# Patient Record
Sex: MALE | Race: ASIAN | NOT HISPANIC OR LATINO | Employment: FULL TIME | URBAN - METROPOLITAN AREA
[De-identification: names, ages, dates, MRNs, and addresses within clinical notes are randomized per-mention and may not be internally consistent; named-entity substitution may affect disease eponyms.]

---

## 2023-08-26 ENCOUNTER — APPOINTMENT (OUTPATIENT)
Dept: URGENT CARE | Facility: PHYSICIAN GROUP | Age: 28
End: 2023-08-26
Payer: COMMERCIAL

## 2023-08-26 ENCOUNTER — OCCUPATIONAL MEDICINE (OUTPATIENT)
Dept: URGENT CARE | Facility: PHYSICIAN GROUP | Age: 28
End: 2023-08-26
Payer: COMMERCIAL

## 2023-08-26 VITALS
HEART RATE: 97 BPM | TEMPERATURE: 98.5 F | HEIGHT: 68 IN | OXYGEN SATURATION: 98 % | BODY MASS INDEX: 28.04 KG/M2 | RESPIRATION RATE: 16 BRPM | WEIGHT: 185 LBS | DIASTOLIC BLOOD PRESSURE: 82 MMHG | SYSTOLIC BLOOD PRESSURE: 126 MMHG

## 2023-08-26 DIAGNOSIS — S29.012A STRAIN OF THORACIC BACK REGION: ICD-10-CM

## 2023-08-26 PROCEDURE — 3074F SYST BP LT 130 MM HG: CPT | Performed by: PHYSICIAN ASSISTANT

## 2023-08-26 PROCEDURE — 3079F DIAST BP 80-89 MM HG: CPT | Performed by: PHYSICIAN ASSISTANT

## 2023-08-26 PROCEDURE — 99203 OFFICE O/P NEW LOW 30 MIN: CPT | Performed by: PHYSICIAN ASSISTANT

## 2023-08-26 NOTE — LETTER
[facility] [address] [address]  Phone:  [phone] - Fax:  [phone]   [facility] Progress Report and Disability Certification  Date of Service: 8/26/2023   No Show:  No  Date / Time of Next Visit: 9/1/2023   Claim Information   Patient Name: Uche Gunn  Claim Number:     Employer: [employer]  Date of Injury: 8/25/2023     Insurer / TPA: [employer]  ID / SSN:     Occupation:   Diagnosis: The encounter diagnosis was Strain of thoracic back region.    Medical Information   Related to Industrial Injury? Yes    Subjective Complaints:  DOI: 8/25/23  Initial Visit   ALEX: Patient states he was lifting a heavy cover with a coworker when he suddenly felt a sharp sudden pain to his left-sided mid back.  He continued to work for several hours until he started lifting again and felt the pain again.  He reported to his supervisor.  He states he only feels the pain with certain positions and lifting.  No radiation pain to his legs, numbness, tingling, weakness, loss of bowel or bladder control.  No second job.  Denies a prior back injury.   Objective Findings: Constitutional: Well-appearing in no acute distress  Musculoskeletal: Back: Full range of flexion, extension, rotation, lateralization. Mild reproduction of pain with left lateralization.   Negative TTP  Negative midline tenderness, bony tenderness, crepitus, deformities, or step-offs.  Neurological: Strength 5/5 bilateral lower extremities.  Normal gait.           Pre-Existing Condition(s):     Assessment:   Initial Visit    Status: Additional Care Required  Permanent Disability:No    Plan:      Diagnostics:      Comments:  Plan:   - Treatment of initial rest with no heavy lifting, stooping, or strenuous activity. Massage, ice and/or heat which ever feels better, and Ibuprofen per manufacture's instructions. Encouraged walking, stretching, and range of motion  exercises as tolerated. Avoid sitting or laying down for long periods of time except for at night during sleep.   - work restrictions per D39   - return on 09/01/23     Disability Information   Status: Released to Restricted Duty    From:  8/26/2023  Through: 9/1/2023 Restrictions are: Temporary   Physical Restrictions   Sitting:    Standing:    Stooping:    Bending:      Squatting:    Walking:    Climbing:    Pushing:      Pulling:    Other:    Reaching Above Shoulder (L):   Reaching Above Shoulder (R):       Reaching Below Shoulder (L):    Reaching Below Shoulder (R):      Not to exceed Weight Limits   Carrying(hrs):   Weight Limit(lb):   Lifting(hrs):   Weight  Limit(lb):     Comments: No lifting, pulling, pushing, pulling more than 10 lbs. No repetitive bending     Repetitive Actions   Hands: i.e. Fine Manipulations from Grasping:     Feet: i.e. Operating Foot Controls:     Driving / Operate Machinery:     Health Care Provider’s Original or Electronic Signature  David Gilman P.A.-C. Health Care Provider’s Original or Electronic Signature    Anuel Costa DO MPH     Clinic Name / Location: [address] [address] Clinic Phone Number: Dept: [phone]   Appointment Time: 3:30 Pm Visit Start Time: 3:58 PM   Check-In Time:  3:55 Pm Visit Discharge Time:  4:34 PM   Original-Treating Physician or Chiropractor    Page 2-Insurer/TPA    Page 3-Employer    Page 4-Employee

## 2023-08-26 NOTE — PROGRESS NOTES
"Subjective:     Uche Gunn is a 27 y.o. male who presents for Back Pain (Low back px ,lifting product . X 1 day )      DOI: 8/25/23  Initial Visit   ALEX: Patient states he was lifting a heavy cover with a coworker when he suddenly felt a sharp sudden pain to his left-sided mid back.  He continued to work for several hours until he started lifting again and felt the pain again.  He reported to his supervisor.  He states he only feels the pain with certain positions and lifting.  No radiation pain to his legs, numbness, tingling, weakness, loss of bowel or bladder control.  No second job.  Denies a prior back injury.    PMH:   No pertinent past medical history to this problem  MEDS:  Medications were reviewed in EMR  ALLERGIES:  Allergies were reviewed in EMR  SOCHX:  Works as a    FH:   No pertinent family history to this problem       Objective:     /82   Pulse 97   Temp 36.9 °C (98.5 °F) (Temporal)   Resp 16   Ht 1.727 m (5' 8\")   Wt 83.9 kg (185 lb)   SpO2 98%   BMI 28.13 kg/m²     Constitutional: Well-appearing in no acute distress  Musculoskeletal: Back: Full range of flexion, extension, rotation, lateralization. Mild reproduction of pain with left lateralization.   Negative TTP  Negative midline tenderness, bony tenderness, crepitus, deformities, or step-offs.  Neurological: Strength 5/5 bilateral lower extremities.  Normal gait.            Assessment/Plan:       1. Strain of thoracic back region    Released to Restricted Duty FROM 8/26/2023 TO 9/1/2023  No lifting, pulling, pushing, pulling more than 10 lbs. No repetitive bending   Plan:   - Treatment of initial rest with no heavy lifting, stooping, or strenuous activity. Massage, ice and/or heat which ever feels better, and Ibuprofen per manufacture's instructions. Encouraged walking, stretching, and range of motion exercises as tolerated. Avoid sitting or laying down for long periods of time except for at night " during sleep.   - work restrictions per D39   - return on 09/01/23     Differential diagnosis, natural history, supportive care, and indications for immediate follow-up discussed.

## 2023-08-26 NOTE — LETTER
"EMPLOYEE’S CLAIM FOR COMPENSATION/ REPORT OF INITIAL TREATMENT  FORM C-4  PLEASE TYPE OR PRINT    EMPLOYEE’S CLAIM - PROVIDE ALL INFORMATION REQUESTED   First Name  Uche Benoit Last Name  Luis A Birthdate                    1995                Sex  male Claim Number (Insurer’s Use Only)   Home Address  [address] Age  27 y.o. Height  1.727 m (5' 8\") Weight  83.9 kg (185 lb) [facility]     [facility] [place] [facility] [place]  [address] Telephone  [phone] (home)    Mailing Address  [address] [facility]  [address] Primary Language Spoken  English    INSURER   THIRD-PARTY     [employer]   Employee's Occupation (Job Title) When Injury or Occupational Disease Occurred      Employer's Name/Company Name  [employer]  Telephone  [phone]    Office Mail Address (Number and Street)  [address]        Date of Injury  8/25/2023               Hours Injury  8:00 PM Date Employer Notified  8/25/2023 Last Day of Work after Injury or Occupational Disease  8/26/2023 Supervisor to Whom Injury     Reported  Tyrell Ramirez & Varun Ascencio   Address or Location of Accident (if applicable)  Work [1]   What were you doing at the time of accident? (if applicable)  lifting service crate cover    How did this injury or occupational disease occur? (Be specific and answer in detail. Use additional sheet if necessary)  my back hurt while lifting service crate cover   If you believe that you have an occupational disease, when did you first have knowledge of the disability and its relationship to your employment?  NA Witnesses to the Accident (if applicable)  Juan Hendrix and Tyrell Ramirez      Nature of Injury or Occupational Disease  Workers' Compensation  Part(s) of Body Injured or Affected  Lower Back Area (Lumbar Area & Lumbo-Sacral), Upper Back Area (Thoracic Area), N/A    I CERTIFY " THAT THE ABOVE IS TRUE AND CORRECT TO T HE BEST OF MY KNOWLEDGE AND THAT I HAVE PROVIDED THIS INFORMATION IN ORDER TO OBTAIN THE BENEFITS OF [place], OR [place]).  I HEREBY AUTHORIZE ANY PHYSICIAN, CHIROPRACTOR, SURGEON, PRACTITIONER OR ANY OTHER PERSON, ANY HOSPITAL, INCLUDING [facility] OR [facility], ANY  MEDICAL SERVICE ORGANIZATION, ANY INSURANCE COMPANY, OR OTHER INSTITUTION OR ORGANIZATION TO RELEASE TO EACH OTHER, ANY MEDICAL OR OTHER INFORMATION, INCLUDING BENEFITS PAID OR PAYABLE, PERTINENT TO THIS INJURY OR DISEASE, EXCEPT INFORMATION RELATIVE TO DIAGNOSIS, TREATMENT AND/OR COUNSELING FOR AIDS, PSYCHOLOGICAL CONDITIONS, ALCOHOL OR CONTROLLED SUBSTANCES, FOR WHICH I MUST GIVE SPECIFIC AUTHORIZATION.  A PHOTOSTAT OF THIS AUTHORIZATION SHALL BE VALID AS THE ORIGINAL.       Date   Place Employee’s Original or  *Electronic Signature   THIS REPORT MUST BE COMPLETED AND MAILED WITHIN 3 WORKING DAYS OF TREATMENT   Place  [facility]  Name of Facility  [facility]   Date  8/26/2023 Diagnosis and Description of Injury or Occupational Disease  (S29.012A) Strain of thoracic back region Is there evidence that the injured employee was under the influence of alcohol and/or another controlled substance at the time of accident?  ? No ? Yes (if yes, please explain)   Hour  3:58 PM   The encounter diagnosis was Strain of thoracic back region. No   Treatment  - Treatment of initial rest with no heavy lifting, stooping, or strenuous activity. Massage, ice and/or heat which ever feels better, and Ibuprofen per manufacture's instructions. Encouraged walking, stretching, and range of motion exercises as tolerated. Avoid sitting or laying down for long periods of time except for at night during sleep.   - work restrictions per D39   - return on 09/01/23   Have you advised the patient to remain off  work five days or     more?    X-Ray Findings      ? Yes Indicate dates:   From   To      From information given by the employee, together with medical evidence, can        you directly connect this injury or occupational disease as job incurred?  Yes ? No If no, is the injured employee capable of:  ? full duty  No ? modified duty  Ag   Is additional medical care by a physician indicated?  Yes If modified duty, specify any limitations / restrictions  Any duty following work restrictions per D39    Do you know of any previous injury or disease contributing to this condition or occupational disease?  ? Yes ? No (Explain if yes)                          No   Date  8/26/2023 Print Health Care Provider's  Name  Kaushik Gilman P.A.-C. I certify that the employer’s copy of  this form was delivered to the employer on:   Address  [address]  [facility] Insurer’s Use Only     [facility]  [address]    Provider’s Tax ID Number  [id number] Telephone  Dept: [phone]             Health Care Provider’s Original or Electronic Signature  e-KAUSHIK Taylor P.A.-C. Degree (MD,DO, DC,PAEBER,APRN)  PAAishaC      * Complete and attach Release of Information (Form C-4A) when injured employee signs C-4 Form electronically  ORIGINAL - TREATING HEALTHCARE PROVIDER PAGE 2 - INSURER/TPA PAGE 3 - EMPLOYER PAGE 4 - EMPLOYEE             Form C-4 (rev.08/21)           BRIEF DESCRIPTION OF RIGHTS AND BENEFITS  (Pursuant to [place] 616C.050)    Notice of Injury or Occupational Disease (Incident Report Form C-1): If an injury or occupational disease (OD) arises out of and in the course of employment, you must provide written notice to your employer as soon as practicable, but no later than 7 days after the accident or OD. Your employer shall maintain a sufficient supply of the required forms.    Claim for Compensation (Form C-4): If medical treatment is sought, the form C-4 is available at the place of initial treatment. A completed  "\"Claim for Compensation\" (Form C-4) must be filed within 90 days after an accident or OD. The treating physician or chiropractor must, within 3 working days after treatment, complete and mail to the employer, the employer's insurer and third-party , the Claim for Compensation.    Medical Treatment: If you require medical treatment for your on-the-job injury or OD, you may be required to select a physician or chiropractor from a list provided by your workers’ compensation insurer, if it has contracted with an Organization for Managed Care (MCO) or Preferred Provider Organization (PPO) or providers of health care. If your employer has not entered into a contract with an MCO or PPO, you may select a physician or chiropractor from the Panel of Physicians and Chiropractors. Any medical costs related to your industrial injury or OD will be paid by your insurer.    Temporary Total Disability (TTD): If your doctor has certified that you are unable to work for a period of at least 5 consecutive days, or 5 cumulative days in a 20-day period, or places restrictions on you that your employer does not accommodate, you may be entitled to TTD compensation.    Temporary Partial Disability (TPD): If the wage you receive upon reemployment is less than the compensation for TTD to which you are entitled, the insurer may be required to pay you TPD compensation to make up the difference. TPD can only be paid for a maximum of 24 months.    Permanent Partial Disability (PPD): When your medical condition is stable and there is an indication of a PPD as a result of your injury or OD, within 30 days, your insurer must arrange for an evaluation by a rating physician or chiropractor to determine the degree of your PPD. The amount of your PPD award depends on the date of injury, the results of the PPD evaluation, your age and wage.    Permanent Total Disability (PTD): If you are medically certified by a treating physician or " chiropractor as permanently and totally disabled and have been granted a PTD status by your insurer, you are entitled to receive monthly benefits not to exceed 66 2/3% of your average monthly wage. The amount of your PTD payments is subject to reduction if you previously received a lump-sum PPD award.    Vocational Rehabilitation Services: You may be eligible for vocational rehabilitation services if you are unable to return to the job due to a permanent physical impairment or permanent restrictions as a result of your injury or occupational disease.    Transportation and Per Alva Reimbursement: You may be eligible for travel expenses and per alva associated with medical treatment.    Reopening: You may be able to reopen your claim if your condition worsens after claim closure.     Appeal Process: If you disagree with a written determination issued by the insurer or the insurer does not respond to your request, you may appeal to the Department of Administration, , by following the instructions contained in your determination letter. You must appeal the determination within 70 days from the date of the determination letter at [address], [address], [place] [address], or [address] [facility], [address], [place] [address]. If you disagree with the  decision, you may appeal to the Department of Administration, . You must file your appeal within 30 days from the date of the  decision letter at [address], [address], [place], [place] [address], or [address], [place] [address]. If you disagree with a decision of an , you may file a petition for judicial review with the District Court. You must do so within 30 days of the Appeal Officer’s decision. You may be represented by an  at your own expense or you may contact the [facility] for possible representation.    [place]  for  Injured Workers (NAIW): If you disagree with a  decision, you may request that NAIW represent you without charge at an  Hearing. For information regarding denial of benefits, you may contact the NA at: [address], [address], [place] [address], [phone], or [address] [facility], [address], [place] [address], [phone]    To File a Complaint with the Division: If you wish to file a complaint with the  of the Division of Industrial Relations (DIR),  please contact the Workers’ Compensation Section, [address], [address], [place], [place] [address], telephone [phone]) [phone], or [address], [address], [place], [place] [address], telephone [phone].    For assistance with Workers’ Compensation Issues: You may contact the [facility] Office for Consumer Health Assistance, [address], [address], [place], [place] [address], Toll Free 1-575.364.6621, Web site: http://[facility].[place].gov/Programs/KELLY E-mail: [email or web address]              __________________________________________________________________                                    _________________            Employee Name / Signature                                                                                                                            Date                                                                                                                                                                                                                              D-2 (rev. 10/20)

## 2023-09-01 ENCOUNTER — OCCUPATIONAL MEDICINE (OUTPATIENT)
Dept: URGENT CARE | Facility: PHYSICIAN GROUP | Age: 28
End: 2023-09-01
Payer: COMMERCIAL

## 2023-09-01 VITALS
DIASTOLIC BLOOD PRESSURE: 84 MMHG | SYSTOLIC BLOOD PRESSURE: 118 MMHG | OXYGEN SATURATION: 97 % | TEMPERATURE: 98 F | BODY MASS INDEX: 28.04 KG/M2 | WEIGHT: 185 LBS | HEART RATE: 75 BPM | HEIGHT: 68 IN | RESPIRATION RATE: 16 BRPM

## 2023-09-01 DIAGNOSIS — S39.012D STRAIN OF LUMBAR REGION, SUBSEQUENT ENCOUNTER: ICD-10-CM

## 2023-09-01 PROCEDURE — 3074F SYST BP LT 130 MM HG: CPT | Performed by: FAMILY MEDICINE

## 2023-09-01 PROCEDURE — 99213 OFFICE O/P EST LOW 20 MIN: CPT | Performed by: FAMILY MEDICINE

## 2023-09-01 PROCEDURE — 3079F DIAST BP 80-89 MM HG: CPT | Performed by: FAMILY MEDICINE

## 2023-09-01 NOTE — LETTER
[facility] [address] [address]  Phone:  [phone] - Fax:  [phone]   [facility] Progress Report and Disability Certification  Date of Service: 9/1/2023   No Show:  No  Date / Time of Next Visit:     Claim Information   Patient Name: Uche Gunn  Claim Number:     Employer: [employer]  Date of Injury: 8/25/2023     Insurer / TPA: [employer]  ID / SSN:     Occupation:   Diagnosis: The encounter diagnosis was Strain of lumbar region, subsequent encounter.    Medical Information   Related to Industrial Injury? Yes    Subjective Complaints:  Reports that the back pain is completely gone   Objective Findings: Full rom without pain, good strength, no sensory deficits.   Pre-Existing Condition(s):     Assessment:   Condition Improved    Status: Discharged /  MMI  Permanent Disability:No    Plan:      Diagnostics:      Comments:       Disability Information   Status: Released to Full Duty    From:     Through:   Restrictions are:     Physical Restrictions   Sitting:    Standing:    Stooping:    Bending:      Squatting:    Walking:    Climbing:    Pushing:      Pulling:    Other:    Reaching Above Shoulder (L):   Reaching Above Shoulder (R):       Reaching Below Shoulder (L):    Reaching Below Shoulder (R):      Not to exceed Weight Limits   Carrying(hrs):   Weight Limit(lb):   Lifting(hrs):   Weight  Limit(lb):     Comments:      Repetitive Actions   Hands: i.e. Fine Manipulations from Grasping:     Feet: i.e. Operating Foot Controls:     Driving / Operate Machinery:     Health Care Provider’s Original or Electronic Signature  Frank Muñoz M.D. Health Care Provider’s Original or Electronic Signature    Anuel Costa DO MPH     Clinic Name / Location: [facility] [address] [address] Clinic Phone Number: Dept: [phone]   Appointment Time: 3:30 Pm Visit Start  Time: 3:58 PM   Check-In Time:  3:29 Pm Visit Discharge Time:  4:36 PM   Original-Treating Physician or Chiropractor    Page 2-Insurer/TPA    Page 3-Employer    Page 4-Employee

## 2023-09-01 NOTE — LETTER
[facility] [address] [address]  Phone:  [phone] - Fax:  [phone]   Occ[facility] Progress Report and Disability Certification  Date of Service: 9/1/2023   No Show:  No  Date / Time of Next Visit:     Claim Information   Patient Name: Uche Gunn  Claim Number:     Employer: [employer]  Date of Injury: 8/25/2023     Insurer / TPA: [employer]  ID / SSN:     Occupation:   Diagnosis: The encounter diagnosis was Strain of lumbar region, subsequent encounter.    Medical Information   Related to Industrial Injury? Yes    Subjective Complaints:  Reports that the back pain is completely gone   Objective Findings: Full rom without pain, good strength, no sensory deficits.   Pre-Existing Condition(s):     Assessment:   Condition Improved    Status: Discharged /  MMI  Permanent Disability:No    Plan:      Diagnostics:      Comments:       Disability Information   Status: Released to Full Duty    From:     Through:   Restrictions are:     Physical Restrictions   Sitting:    Standing:    Stooping:    Bending:      Squatting:    Walking:    Climbing:    Pushing:      Pulling:    Other:    Reaching Above Shoulder (L):   Reaching Above Shoulder (R):       Reaching Below Shoulder (L):    Reaching Below Shoulder (R):      Not to exceed Weight Limits   Carrying(hrs):   Weight Limit(lb):   Lifting(hrs):   Weight  Limit(lb):     Comments:      Repetitive Actions   Hands: i.e. Fine Manipulations from Grasping:     Feet: i.e. Operating Foot Controls:     Driving / Operate Machinery:     Health Care Provider’s Original or Electronic Signature  Frank Muñoz M.D. Health Care Provider’s Original or Electronic Signature    Anuel Costa DO MPH     Clinic Name / Location: [facility] [address] [address] Clinic Phone Number: Dept: [phone]   Appointment Time: 3:30 Pm Visit Start  Time: 3:58 PM   Check-In Time:  3:29 Pm Visit Discharge Time:  4:35 PM   Original-Treating Physician or Chiropractor    Page 2-Insurer/TPA    Page 3-Employer    Page 4-Employee

## 2023-09-01 NOTE — PROGRESS NOTES
"Subjective:   Uche Gunn is a 27 y.o. male who presents for Follow-Up (WC Follow up , back injury. Almost better .)      HPI    ROS    Medications, Allergies, and current problem list reviewed today in Epic.     Objective:     /84   Pulse 75   Temp 36.7 °C (98 °F) (Temporal)   Resp 16   Ht 1.727 m (5' 8\")   Wt 83.9 kg (185 lb)   SpO2 97%     Physical Exam  Musculoskeletal:      Comments: No pain on palpation, no pain with rom, able to stoop, and bend without problems.         Assessment/Plan:     Diagnosis and associated orders:     1. Strain of lumbar region, subsequent encounter           Comments/MDM:     Return to work no restrictions         Differential diagnosis, natural history, supportive care, and indications for immediate follow-up discussed.    Advised the patient to follow-up with the primary care physician for recheck, reevaluation, and consideration of further management.    Please note that this dictation was created using voice recognition software. I have made a reasonable attempt to correct obvious errors, but I expect that there are errors of grammar and possibly content that I did not discover before finalizing the note.    This note was electronically signed by Frank Muñoz M.D.  "

## 2025-04-09 ENCOUNTER — OCCUPATIONAL MEDICINE (OUTPATIENT)
Dept: URGENT CARE | Facility: PHYSICIAN GROUP | Age: 30
End: 2025-04-09
Payer: COMMERCIAL

## 2025-04-09 VITALS
RESPIRATION RATE: 18 BRPM | WEIGHT: 202.4 LBS | DIASTOLIC BLOOD PRESSURE: 76 MMHG | SYSTOLIC BLOOD PRESSURE: 122 MMHG | BODY MASS INDEX: 30.68 KG/M2 | HEART RATE: 82 BPM | HEIGHT: 68 IN | TEMPERATURE: 97.9 F | OXYGEN SATURATION: 98 %

## 2025-04-09 DIAGNOSIS — M76.61 ACHILLES TENDINITIS OF RIGHT LOWER EXTREMITY: ICD-10-CM
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
## 2025-04-09 PROCEDURE — 99213 OFFICE O/P EST LOW 20 MIN: CPT | Performed by: PHYSICIAN ASSISTANT

## 2025-04-09 PROCEDURE — 3078F DIAST BP <80 MM HG: CPT | Performed by: PHYSICIAN ASSISTANT

## 2025-04-09 PROCEDURE — 3074F SYST BP LT 130 MM HG: CPT | Performed by: PHYSICIAN ASSISTANT

## 2025-04-09 RX ORDER — METHYLPREDNISOLONE 4 MG/1
4 TABLET ORAL DAILY
Qty: 21 TABLET | Refills: 0 | Status: SHIPPED | OUTPATIENT
Start: 2025-04-09 | End: 2025-04-21

## 2025-04-09 NOTE — PROGRESS NOTES
"Subjective:     Uche Gunn is a 29 y.o. 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  Describes the pain as being on the heel where the Achilles tendon inserts.  Symptoms are worsened with climbing up and down stairs frequently and with stretching of the Achilles tendon.  No numbness/tingling or weakness.  Has not used medications for symptom support.  No secondary occupation, no predisposing injury.    PMH:   No pertinent past medical history to this problem  MEDS:  Medications were reviewed in EMR  ALLERGIES:  Allergies were reviewed in EMR  SOCHX:  Works as a   FH:   No pertinent family history to this problem       Objective:     /76   Pulse 82   Temp 36.6 °C (97.9 °F) (Temporal)   Resp 18   Ht 1.727 m (5' 8\")   Wt 91.8 kg (202 lb 6.4 oz)   SpO2 98%   BMI 30.77 kg/m²     Examination of the right foot is negative for tenderness on the plantar fascial structures.  Tenderness directly over the Achilles insertion point on the calcaneus.  No tenderness along the Achilles tendon.      Diagnostic:  None    Assessment/Plan:     Encounter Diagnoses   Name Primary?    Achilles tendinitis of right lower extremity     Work related injury          Plan:  Trial Medrol Dosepak.  Work restrictions provided.  Return in 5 days for reevaluation.    Differential diagnosis, natural history, supportive care, and indications for immediate follow-up discussed.    Seven Stallings PA-C    "

## 2025-04-09 NOTE — LETTER
PHYSICIAN’S AND CHIROPRACTIC PHYSICIAN'S   PROGRESS REPORT   CERTIFICATION OF DISABILITY Claim Number:     Social Security Number:    Patient’s Name: Uche Gunn Date of Injury: 4/1/2025   Employer: [employer] Name of MCO (if applicable):      Patient’s Job Description/Occupation:        Previous Injuries/Diseases/Surgeries Contributing to the Condition:         Diagnosis: (M76.61) Achilles tendinitis of right lower extremity  (Y99.0) Work related injury      Related to the Industrial Injury? 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  Describes the pain as being on the heel where the Achilles tendon inserts.  Symptoms are worsened with climbing up and down stairs frequently and with stretching of the Achilles tendon.  No numbness/tingling or weakness.  Has not used medications for symptom support.  No secondary occupation, no predisposing injury.      Objective Medical Findings: Examination of the right foot is negative for tenderness on the plantar fascial structures.  Tenderness directly over the Achilles insertion point on the calcaneus.  No tenderness along the Achilles tendon.         None - Discharged                         Stable  No                 Ratable  No        Generally Improved                         Condition Worsened                  Condition Same  May Have Suffered a Permanent Disability No     Treatment Plan:    Trial Medrol Dosepak.  Work restrictions provided.  Return in 5 days for reevaluation.         No Change in Therapy                  PT/OT Prescribed                      Medication May be Used While Working        Case Management                          PT/OT Discontinued    Consultation    Further Diagnostic Studies:    Prescription(s)           Medrol  Dosepak     Released to FULL DUTY /No Restrictions on (Date):       Certified TOTALLY TEMPORARILY DISABLED (Indicate Dates) From:   To:    X  Released to RESTRICTED/Modified Duty on (Date): From: 4/9/2025 To: 4/14/2025  Restrictions Are:  Temporary      No Sitting    No Standing    No Pulling Other: No climbing stairs       No Bending at Waist     No Stooping     No Lifting        No Carrying     No Walking Lifting Restricted to (lbs.):          No Pushing     X   No Climbing     No Reaching Above Shoulders       Date of Next Visit:  4/14/2025 Date of this Exam: 4/9/2025 Physician/Chiropractic Physician Name: Seven Stallings P.A.-C. Physician/Chiropractic Physician Signature:  Anuel Costa DO MPH     [place]:  [address] [place] [address], [address] [place], [place] [address] - Telephone [phone] [place]:  [address] [place] [address], [address] [place], [place] [address] - Telephone [phone]    [email or web address]  D-39 (Rev. 10/24)

## 2025-04-09 NOTE — LETTER
"  EMPLOYEE’S CLAIM FOR COMPENSATION/ REPORT OF INITIAL TREATMENT  FORM C-4  PLEASE TYPE OR PRINT    EMPLOYEE’S CLAIM - PROVIDE ALL INFORMATION REQUESTED   First Name                    KATI Benoit                  Last Name  Luis A Birthdate                    1995                Sex  [x]Male Claim Number (Insurer’s Use Only)     Mailing Address  [address] Age  29 y.o. Height  1.727 m (5' 8\") Weight  91.8 kg (202 lb 6.4 oz) Social Security Number     [facility]  [address] Telephone  [phone] (home)    Email  [email or web address]    Primary Language Spoken  English    INSURER   THIRD-PARTY   [employer]   Employee's Occupation (Job Title) When Injury or Occupational Disease Occurred      Employer's Name/Company Name  [employer]  Telephone  [phone]    Office Mail Address (Number and Street)  [address]     Date of Injury (if applicable) 4/1/2025               Hours Injury (if applicable)  5:00 AM am    pm Date Employer Notified  4/2/2025 Last Day of Work after Injury or Occupational Disease  4/6/2025 Supervisor to Whom Injury Reported  [employer]   Address or Location of Accident (if applicable)  Work [1]   What were you doing at the time of accident? 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 (Be specific and answer in detail. Use additional sheet if necessary)  my right foot heel hurt and start to progress   If you believe that you have an occupational disease, when did you first have knowledge of the disability and its relationship to your employment?  n/a Witnesses to the Accident (if applicable)  n/a      Nature of Injury or Occupational Disease  Strain  Part(s) of Body Injured or Affected  Foot (R) N/A N/A    I CERTIFY THAT THE " ABOVE IS TRUE AND CORRECT TO T HE BEST OF MY KNOWLEDGE AND THAT I HAVE PROVIDED THIS INFORMATION IN ORDER TO OBTAIN THE BENEFITS OF [place], OR CHAPTER 617 OF [place]RS).  I HEREBY AUTHORIZE ANY PHYSICIAN, CHIROPRACTOR, SURGEON, PRACTITIONER OR ANY OTHER PERSON, ANY HOSPITAL, INCLUDING [facility] OR [facility], ANY  MEDICAL SERVICE ORGANIZATION, ANY INSURANCE COMPANY, OR OTHER INSTITUTION OR ORGANIZATION TO RELEASE TO EACH OTHER, ANY MEDICAL OR OTHER INFORMATION, INCLUDING BENEFITS PAID OR PAYABLE, PERTINENT TO THIS INJURY OR DISEASE, EXCEPT INFORMATION RELATIVE TO DIAGNOSIS, TREATMENT AND/OR COUNSELING FOR AIDS, PSYCHOLOGICAL CONDITIONS, ALCOHOL OR CONTROLLED SUBSTANCES, FOR WHICH I MUST GIVE SPECIFIC AUTHORIZATION.  A PHOTOSTAT OF THIS AUTHORIZATION SHALL BE VALID AS THE ORIGINAL.     Date   Place Employee’s Original or  *Electronic Signature   THIS REPORT MUST BE COMPLETED AND MAILED WITHIN 3 WORKING DAYS OF TREATMENT   Place  [facility]    Name of Facility  [facility]   Date 4/9/2025 Diagnosis and Description of Injury or Occupational Disease  (M76.61) Achilles tendinitis of right lower extremity  (Y99.0) Work related injury  Diagnoses of Achilles tendinitis of right lower extremity and Work related injury were pertinent to this visit. Is there evidence that the injured employee was under the influence of alcohol and/or another controlled substance at the time of accident?  []No  [] Yes (if yes, please explain)   Hour 1:05 PM  No   Treatment: Trial Medrol Dosepak.  Work restrictions provided.  Return in 5 days for reevaluation.      Have you advised the patient to remain off work five days or more?   No  [] Yes  If yes, indicate dates: From_ _                                                      To __ _  [] No   If no, is the injured employee capable of: [] full duty No   [] modified duty Yes    If  modified duty, specify any limitations / restrictions:__________________  ___See R20___________________________     X-Ray Findings:      From information given by the employee, together with medical evidence, can you directly connect this injury or occupational disease as job incurred?  []Yes   [] No Yes    Is additional medical care by a physician indicated? []Yes [] No  Yes    Do you know of any previous injury or disease contributing to this condition or occupational disease? []Yes [] No (Explain if yes)                          No   Date  4/9/2025 Print Health Care Provider’s Name  Jessica Stallings P.A.-C. I certify that the employer’s copy of  this form was delivered to the employer on:   Address  [address]  [facility] INSURER'S USE ONLY                       [facility]  [address] Provider’s Tax ID Number  [id number]   Telephone  Dept: [phone]    Health Care Provider’s Original or Electronic Signature      e-JESSICA King P.A.-C.    Degree (MD,DO, DC,PAAishaC,APRN)  PAEBER  Choose (if applicable)      ORIGINAL - TREATING HEALTHCARE PROVIDER PAGE 2 - INSURER/TPA PAGE 3 - EMPLOYER PAGE 4 - EMPLOYEE             Form C-4 (rev.02/25)

## 2025-04-09 NOTE — LETTER
PHYSICIAN’S AND CHIROPRACTIC PHYSICIAN'S   PROGRESS REPORT   CERTIFICATION OF DISABILITY Claim Number:     Social Security Number:    Patient’s Name: Uche Gunn Date of Injury: 4/1/2025   Employer: [employer] Name of MCO (if applicable):      Patient’s Job Description/Occupation:        Previous Injuries/Diseases/Surgeries Contributing to the Condition:         Diagnosis: (M76.61) Achilles tendinitis of right lower extremity  (Y99.0) Work related injury      Related to the Industrial Injury? 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  Describes the pain as being on the heel where the Achilles tendon inserts.  Symptoms are worsened with climbing up and down stairs frequently and with stretching of the Achilles tendon.  No numbness/tingling or weakness.  Has not used medications for symptom support.  No secondary occupation, no predisposing injury.      Objective Medical Findings: Examination of the right foot is negative for tenderness on the plantar fascial structures.  Tenderness directly over the Achilles insertion point on the calcaneus.  No tenderness along the Achilles tendon.         None - Discharged                         Stable  No                 Ratable  No        Generally Improved                         Condition Worsened                  Condition Same  May Have Suffered a Permanent Disability No     Treatment Plan:    Trial Medrol Dosepak.  Work restrictions provided.  Return in 5 days for reevaluation.         No Change in Therapy                  PT/OT Prescribed                      Medication May be Used While Working        Case Management                          PT/OT Discontinued    Consultation    Further Diagnostic Studies:    Prescription(s)           Medrol  Dosepak     Released to FULL DUTY /No Restrictions on (Date):       Certified TOTALLY TEMPORARILY DISABLED (Indicate Dates) From:   To:    X  Released to RESTRICTED/Modified Duty on (Date): From: 4/9/2025 To: 4/14/2025  Restrictions Are:  Temporary      No Sitting    No Standing X   No Pulling Other: No climbing stairs       No Bending at Waist     No Stooping     No Lifting        No Carrying     No Walking Lifting Restricted to (lbs.):       X   No Pushing     X   No Climbing     No Reaching Above Shoulders       Date of Next Visit:  4/14/2025 Date of this Exam: 4/9/2025 Physician/Chiropractic Physician Name: Seven Stallings P.A.-C. Physician/Chiropractic Physician Signature:  Anuel Costa DO MPH     [place]:  [address] [place] [address], [address] [place], [place] [address] - Telephone [phone] [place]:  [address] [place] [address], [address] [place], [place] [address] - Telephone [phone]    [email or web address]  D-39 (Rev. 10/24)

## 2025-04-09 NOTE — LETTER
"  EMPLOYEE’S CLAIM FOR COMPENSATION/ REPORT OF INITIAL TREATMENT  FORM C-4  PLEASE TYPE OR PRINT    EMPLOYEE’S CLAIM - PROVIDE ALL INFORMATION REQUESTED   First Name                    KATI Benoit                  Last Name  Luis A Birthdate                    1995                Sex  [x]Male Claim Number (Insurer’s Use Only)     Mailing Address  [address] Age  29 y.o. Height  1.727 m (5' 8\") Weight  91.8 kg (202 lb 6.4 oz) Social Security Number     [facility]  [address] Telephone  [phone] (home)    Email  [email or web address]    Primary Language Spoken  English    INSURER   THIRD-PARTY   [employer]   Employee's Occupation (Job Title) When Injury or Occupational Disease Occurred      Employer's Name/Company Name  [employer]  Telephone  [phone]    Office Mail Address (Number and Street)  [address]     Date of Injury (if applicable) 4/1/2025               Hours Injury (if applicable)  5:00 AM am    pm Date Employer Notified  4/2/2025 Last Day of Work after Injury or Occupational Disease  4/6/2025 Supervisor to Whom Injury Reported  SONIA FORREST   Address or Location of Accident (if applicable)  Work [1]   What were you doing at the time of accident? 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 (Be specific and answer in detail. Use additional sheet if necessary)  my right foot heel hurt and start to progress   If you believe that you have an occupational disease, when did you first have knowledge of the disability and its relationship to your employment?  n/a Witnesses to the Accident (if applicable)  n/a      Nature of Injury or Occupational Disease  Strain  Part(s) of Body Injured or Affected  Foot (R) N/A N/A    I CERTIFY THAT THE " ABOVE IS TRUE AND CORRECT TO T HE BEST OF MY KNOWLEDGE AND THAT I HAVE PROVIDED THIS INFORMATION IN ORDER TO OBTAIN THE BENEFITS OF [place], [place], OR CHAPTER 617 OF [place]).  I HEREBY AUTHORIZE ANY PHYSICIAN, CHIROPRACTOR, SURGEON, PRACTITIONER OR ANY OTHER PERSON, ANY HOSPITAL, INCLUDING [facility], ANY  MEDICAL SERVICE ORGANIZATION, ANY INSURANCE COMPANY, OR OTHER INSTITUTION OR ORGANIZATION TO RELEASE TO EACH OTHER, ANY MEDICAL OR OTHER INFORMATION, INCLUDING BENEFITS PAID OR PAYABLE, PERTINENT TO THIS INJURY OR DISEASE, EXCEPT INFORMATION RELATIVE TO DIAGNOSIS, TREATMENT AND/OR COUNSELING FOR AIDS, PSYCHOLOGICAL CONDITIONS, ALCOHOL OR CONTROLLED SUBSTANCES, FOR WHICH I MUST GIVE SPECIFIC AUTHORIZATION.  A PHOTOSTAT OF THIS AUTHORIZATION SHALL BE VALID AS THE ORIGINAL.     Date   Place Employee’s Original or  *Electronic Signature   THIS REPORT MUST BE COMPLETED AND MAILED WITHIN 3 WORKING DAYS OF TREATMENT   Place  [facility]    Name of Facility  [place]   Date 4/9/2025 Diagnosis and Description of Injury or Occupational Disease  (M76.61) Achilles tendinitis of right lower extremity  (Y99.0) Work related injury  Diagnoses of Achilles tendinitis of right lower extremity and Work related injury were pertinent to this visit. Is there evidence that the injured employee was under the influence of alcohol and/or another controlled substance at the time of accident?  []No  [] Yes (if yes, please explain)   Hour 1:05 PM  No   Treatment: Trial Medrol Dosepak.  Work restrictions provided.  Return in 5 days for reevaluation.      Have you advised the patient to remain off work five days or more?   No  [] Yes  If yes, indicate dates: From_ _                                                      To __ _  [] No   If no, is the injured employee capable of: [] full duty No   [] modified duty Yes    If  modified duty, specify any limitations / restrictions:__________________  ___See Z06___________________________     X-Ray Findings:      From information given by the employee, together with medical evidence, can you directly connect this injury or occupational disease as job incurred?  []Yes   [] No Yes    Is additional medical care by a physician indicated? []Yes [] No  Yes    Do you know of any previous injury or disease contributing to this condition or occupational disease? []Yes [] No (Explain if yes)                          No   Date  4/9/2025 Print Health Care Provider’s Name  Jessica Stallings P.A.-C. I certify that the employer’s copy of  this form was delivered to the employer on:   Address  [address]  [facility] INSURER'S USE ONLY                       [facility]  [address] Provider’s Tax ID Number  [id number]   Telephone  Dept: [phone]    Health Care Provider’s Original or Electronic Signature      e-JESSICA King P.A.-C.    Degree (MD,DO, DC,PAAishaC,APRN)  PAEBER  Choose (if applicable)      ORIGINAL - TREATING HEALTHCARE PROVIDER PAGE 2 - INSURER/TPA PAGE 3 - EMPLOYER PAGE 4 - EMPLOYEE             Form C-4 (rev.02/25)

## 2025-04-14 ENCOUNTER — OCCUPATIONAL MEDICINE (OUTPATIENT)
Dept: URGENT CARE | Facility: PHYSICIAN GROUP | Age: 30
End: 2025-04-14
Payer: COMMERCIAL

## 2025-04-14 VITALS
TEMPERATURE: 97.5 F | DIASTOLIC BLOOD PRESSURE: 88 MMHG | OXYGEN SATURATION: 97 % | RESPIRATION RATE: 12 BRPM | SYSTOLIC BLOOD PRESSURE: 130 MMHG | HEIGHT: 68 IN | WEIGHT: 204 LBS | HEART RATE: 93 BPM | BODY MASS INDEX: 30.92 KG/M2

## 2025-04-14 DIAGNOSIS — M76.61 ACHILLES TENDINITIS OF RIGHT LOWER EXTREMITY: ICD-10-CM

## 2025-04-14 PROCEDURE — 99213 OFFICE O/P EST LOW 20 MIN: CPT | Performed by: FAMILY MEDICINE

## 2025-04-14 PROCEDURE — 3079F DIAST BP 80-89 MM HG: CPT | Performed by: FAMILY MEDICINE

## 2025-04-14 PROCEDURE — 3075F SYST BP GE 130 - 139MM HG: CPT | Performed by: FAMILY MEDICINE

## 2025-04-14 ASSESSMENT — ENCOUNTER SYMPTOMS
SENSORY CHANGE: 0
FOCAL WEAKNESS: 0
BACK PAIN: 0

## 2025-04-14 NOTE — LETTER
PHYSICIAN’S AND CHIROPRACTIC PHYSICIAN'S   PROGRESS REPORT   CERTIFICATION OF DISABILITY Claim Number:     Social Security Number:    Patient’s Name: Uche Gunn Date of Injury: 4/1/2025   Employer: [employer] Name of MCO (if applicable):      Patient’s Job Description/Occupation:        Previous Injuries/Diseases/Surgeries Contributing to the Condition:  NA        Diagnosis: (M76.61) Achilles tendinitis of right lower extremity      Related to the Industrial Injury? 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      Objective Medical Findings: Right foot and ankle: no point tenderness, full range of motion, full strength         None - Discharged                         Stable  No                 Ratable  No     X   Generally Improved                         Condition Worsened                  Condition Same  May Have Suffered a Permanent Disability No     Treatment Plan:    Advance work restrictions           No Change in Therapy                  PT/OT Prescribed                      Medication May be Used While Working        Case Management                          PT/OT Discontinued    Consultation    Further Diagnostic Studies:    Prescription(s)                 Released to FULL DUTY /No Restrictions on (Date):       Certified TOTALLY TEMPORARILY DISABLED (Indicate Dates) From:   To:    X  Released to RESTRICTED/Modified Duty on (Date): From: 4/14/2025 To: 4/21/2025  Restrictions Are:  Temporary      No Sitting    No Standing    No Pulling Other: Pushing, pulling, lifting, and carrying limited to 2 hours in shift.       No Bending at Waist     No Stooping     No Lifting        No Carrying     No Walking Lifting Restricted to (lbs.):  < or = to 10 pounds       No Pushing     X   No Climbing     No Reaching Above Shoulders       Date of Next Visit:  4/21/2025 Date of this Exam: 4/14/2025 Physician/Chiropractic Physician Name: Jimmie Guy M.D.  Physician/Chiropractic Physician Signature:  Anuel Costa DO MPH     [place]:  [address] [place] [address], [address] [place], [place] [address] - Telephone [phone] [place]:  [address] [place] [address], [address] [place], [place] [address] - Telephone [phone]    [email or web address]  D-39 (Rev. 10/24)

## 2025-04-14 NOTE — PROGRESS NOTES
"Subjective     Uche Gunn is a 29 y.o. 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  No longer limping.  He is looking into different footwear.  No second job or outside activity contributing.  No other aggravating alleviating factors.        Review of Systems   Musculoskeletal:  Negative for back pain and joint pain.   Neurological:  Negative for sensory change and focal weakness.              Objective     /88 (BP Location: Left arm, Patient Position: Sitting, BP Cuff Size: Adult long)   Pulse 93   Temp 36.4 °C (97.5 °F) (Temporal)   Resp 12   Ht 1.727 m (5' 8\")   Wt 92.5 kg (204 lb)   SpO2 97%   BMI 31.02 kg/m²      Physical Exam  Constitutional:       Appearance: Normal appearance.   Neurological:      Mental Status: He is alert.         Right foot and ankle: no point tenderness, full range of motion, full strength               Urgent care visit 4/9/25 reviewed    1. Achilles tendinitis of right lower extremity          Advance work restrictions.  Follow-up in 1 week.          "

## 2025-04-21 ENCOUNTER — OCCUPATIONAL MEDICINE (OUTPATIENT)
Dept: URGENT CARE | Facility: PHYSICIAN GROUP | Age: 30
End: 2025-04-21
Payer: COMMERCIAL

## 2025-04-21 VITALS
WEIGHT: 200.73 LBS | TEMPERATURE: 97.5 F | HEART RATE: 83 BPM | RESPIRATION RATE: 14 BRPM | OXYGEN SATURATION: 98 % | SYSTOLIC BLOOD PRESSURE: 128 MMHG | DIASTOLIC BLOOD PRESSURE: 74 MMHG | BODY MASS INDEX: 30.42 KG/M2 | HEIGHT: 68 IN

## 2025-04-21 DIAGNOSIS — M76.61 ACHILLES TENDINITIS OF RIGHT LOWER EXTREMITY: ICD-10-CM

## 2025-04-21 PROCEDURE — 99213 OFFICE O/P EST LOW 20 MIN: CPT | Performed by: NURSE PRACTITIONER

## 2025-04-21 PROCEDURE — 3078F DIAST BP <80 MM HG: CPT | Performed by: NURSE PRACTITIONER

## 2025-04-21 PROCEDURE — 3074F SYST BP LT 130 MM HG: CPT | Performed by: NURSE PRACTITIONER

## 2025-04-21 ASSESSMENT — VISUAL ACUITY: OU: 1

## 2025-04-21 NOTE — PROGRESS NOTES
"Subjective:     Uche Gunn is a 29 y.o. 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  Describes the pain as being on the heel where the Achilles tendon inserts.  Symptoms are worsened with climbing up and down stairs frequently and with stretching of the Achilles tendon.  No numbness/tingling or weakness.  Has not used medications for symptom support.  No secondary occupation, no predisposing injury.\"    Dx: Achilles tendinitis RLE. Tx: Medrol. Restrictions.    Follow-up UC visit 4/14/2025:    Improved. Advanced restrictions.    Follow-up UC visit today 4/21/2025:    Symptoms improving overall.  Reports improving pain at posterior right heel.  Ankle ROM gradually improving.  Does report new onset of soreness at the bottom of the heel likely related to antalgic gait.  Otherwise, no other/new symptoms.  Working within restrictions.    Review of Systems   Musculoskeletal:         Per HPI   All other systems reviewed and are negative.    Refer to HPI for additional details.    During this visit, appropriate PPE was worn and hand hygiene was performed.    PMH: No pertinent past medical history to this problem  MEDS: Medications were reviewed in Epic  ALLERGIES: Allergies were reviewed in Epic  SOCHX: Works as a  at [employer]  FH: No pertinent family history to this problem      Objective:     /74   Pulse 83   Temp 36.4 °C (97.5 °F) (Temporal)   Resp 14   Ht 1.727 m (5' 8\")   Wt 91 kg (200 lb 11.7 oz)   SpO2 98%   BMI 30.52 kg/m²     Physical Exam  Nursing note reviewed.   Constitutional:       General: He is not in acute distress.     " Appearance: He is well-developed. He is not ill-appearing or toxic-appearing.   Eyes:      General: Vision grossly intact.   Cardiovascular:      Rate and Rhythm: Normal rate.   Pulmonary:      Effort: Pulmonary effort is normal. No respiratory distress.   Musculoskeletal:         General: No deformity.      Right ankle: No swelling or deformity. Decreased range of motion (Improving).      Right foot: Normal range of motion. No swelling or deformity.   Skin:     Coloration: Skin is not pale.   Neurological:      Mental Status: He is alert and oriented to person, place, and time.      Motor: No weakness.      Gait: Gait abnormal (Antalgic).   Psychiatric:         Behavior: Behavior normal. Behavior is cooperative.       Assessment/Plan:     1. Achilles tendinitis of right lower extremity    Condition slowly improving.  Continue work restrictions.  Continue previously recommended therapy.  Monitor. Seek immediate medical attention if symptoms change/worsen.     Patient plans to request extended leave from his employer if possible as his condition improves. He reports he will consult his mother who is a PT for additional care. Next follow up scheduled 5/27/2025. Advised to return sooner if symptoms change/worsen.    Differential diagnosis, natural history, supportive care, over-the-counter symptom management per 's instructions, close monitoring, and indications for immediate follow-up discussed.     All questions answered. Patient agrees with the plan of care.

## 2025-04-21 NOTE — LETTER
PHYSICIAN’S AND CHIROPRACTIC PHYSICIAN'S   PROGRESS REPORT   CERTIFICATION OF DISABILITY Claim Number:     Social Security Number:    Patient’s Name: Uche Gunn Date of Injury: 4/1/2025   Employer: [employer] Name of MCO (if applicable):      Patient’s Job Description/Occupation:        Previous Injuries/Diseases/Surgeries Contributing to the Condition:  See past notes      Diagnosis: (M76.61) Achilles tendinitis of right lower extremity      Related to the Industrial Injury? Yes     Explain: See past notes        Objective Medical Findings: Right ankle: improving ROM, no deformity or swelling; antalgic gait.         None - Discharged                         Stable  Yes                 Ratable  No     X   Generally Improved                         Condition Worsened                  Condition Same  May Have Suffered a Permanent Disability No     Treatment Plan:    Condition slowly improving.  Continue work restrictions.  Continue previously recommended therapy.  Monitor. Seek immediate medical attention if symptoms change/worsen.          No Change in Therapy                  PT/OT Prescribed                      Medication May be Used While Working        Case Management                          PT/OT Discontinued    Consultation    Further Diagnostic Studies:    Prescription(s)                 Released to FULL DUTY /No Restrictions on (Date):       Certified TOTALLY TEMPORARILY DISABLED (Indicate Dates) From:   To:    X  Released to RESTRICTED/Modified Duty on (Date): From: 4/21/2025 To: 5/27/2025  Restrictions Are:  Temporary      No Sitting    No Standing    No Pulling Other: Pushing, pulling, carrying, lifting limited to 10 lb and 2 hours per shift       No Bending at Waist     No Stooping     No Lifting        No Carrying     No Walking Lifting Restricted to (lbs.):  < or = to 10 pounds       No Pushing     X   No Climbing     No Reaching Above Shoulders       Date of  Next Visit:  5/27/2025  @ 8 AM  Date of this Exam: 4/21/2025 Physician/Chiropractic Physician Name: REMEDIOS Brito Physician/Chiropractic Physician Signature:  Anuel Costa DO MPH     [place]:  [facility]  [facility], [address] [place], [place] [address] - Telephone [phone] [place]:  [address] [place] [address], [address] [place], [place] [address] - Telephone [phone]    [email or web address]  D-39 (Rev. 10/24)

## 2025-05-27 ENCOUNTER — OCCUPATIONAL MEDICINE (OUTPATIENT)
Dept: OCCUPATIONAL MEDICINE | Facility: CLINIC | Age: 30
End: 2025-05-27
Payer: COMMERCIAL

## 2025-05-27 VITALS
DIASTOLIC BLOOD PRESSURE: 88 MMHG | HEART RATE: 99 BPM | OXYGEN SATURATION: 97 % | RESPIRATION RATE: 20 BRPM | BODY MASS INDEX: 31.1 KG/M2 | TEMPERATURE: 97.9 F | WEIGHT: 205.2 LBS | HEIGHT: 68 IN | SYSTOLIC BLOOD PRESSURE: 144 MMHG

## 2025-05-27 DIAGNOSIS — M76.61 TENDONITIS, ACHILLES, RIGHT: Primary | ICD-10-CM

## 2025-05-27 PROCEDURE — 3077F SYST BP >= 140 MM HG: CPT | Performed by: PREVENTIVE MEDICINE

## 2025-05-27 PROCEDURE — 99212 OFFICE O/P EST SF 10 MIN: CPT | Performed by: PREVENTIVE MEDICINE

## 2025-05-27 PROCEDURE — 3079F DIAST BP 80-89 MM HG: CPT | Performed by: PREVENTIVE MEDICINE

## 2025-05-27 NOTE — PROGRESS NOTES
"Subjective:   Uche Gunn is a 29 y.o. 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  He had pretty much has no pain at this point.  Feels ready for release and full duty    PMH:   Reviewed, see above  MEDS:  Medications were reviewed in EMR  ALLERGIES:  Allergies were reviewed in EMR  [employer]CH[employer]:  Works as a    FH:   No pertinent family history to this problem     Objective:   BP (!) 144/88   Pulse 99   Temp 36.6 °C (97.9 °F)   Resp 20   Ht 1.727 m (5' 8\")   Wt 93.1 kg (205 lb 3.2 oz)   SpO2 97%   BMI 31.20 kg/m²     Constitutional: Patient is in no acute distress. Appears well-developed and well-nourished.   Cardiovascular: Normal rate.    Pulmonary/Chest: Effort normal. No respiratory distress.   Neurological: Patient is alert and oriented to person, place, and time.   Skin: Skin is warm and dry.   Psychiatric: Normal mood and affect. Behavior is normal.     Right Foot/Ankle: No gross deformity.  No tenderness palpation.  Full range of motion.    Assessment/Plan:     1. Tendonitis, Achilles, right      Released to Full Duty - discharged M.M.I. (Maximally Medically Improved)  Release from care  Full duty  Follow-up as needed    Differential diagnosis, natural history, supportive care, and indications for immediate follow-up discussed.      Anuel Costa D.O.      "

## 2025-05-27 NOTE — LETTER
PHYSICIAN’S AND CHIROPRACTIC PHYSICIAN'S   PROGRESS REPORT   CERTIFICATION OF DISABILITY Claim Number:     Social Security Number:    Patient’s Name: Uche Gunn Date of Injury: 4/1/2025   Employer: [employer] Name of MCO (if applicable):      Patient’s Job Description/Occupation:        Previous Injuries/Diseases/Surgeries Contributing to the Condition:         Diagnosis: (M76.61) Tendonitis, Achilles, right  (primary encounter diagnosis)      Related to the Industrial Injury? 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  No tenderness palpation.  Full range of motion.      X   None - Discharged                         Stable  Yes                 Ratable  No     X   Generally Improved                         Condition Worsened                  Condition Same  May Have Suffered a Permanent Disability No     Treatment Plan:    Release from care  Full duty  Follow-up as needed         No Change in Therapy                  PT/OT Prescribed                      Medication May be Used While Working        Case Management                          PT/OT Discontinued    Consultation    Further Diagnostic Studies:    Prescription(s)               X  Released to FULL DUTY /No Restrictions on (Date):       Certified TOTALLY TEMPORARILY DISABLED (Indicate Dates) From:   To:      Released to RESTRICTED/Modified Duty on (Date): From:   To:    Restrictions Are:         No Sitting    No Standing    No Pulling Other:         No Bending at Waist     No Stooping     No Lifting        No Carrying     No Walking Lifting Restricted to (lbs.):          No Pushing     X   No Climbing     No Reaching Above Shoulders       Date of Next Visit:   Release from care Date of this Exam: 5/27/2025 Physician/Chiropractic Physician Name: Anuel Costa D.O.  Physician/Chiropractic Physician Signature:  Anuel Costa DO MPH     [place]:  [address] [place] [address], [address] [place], [place] [address] - Telephone [phone] [place]:  [address] [place] [address], [address] [place], [place] [address] - Telephone [phone]    [email or web address]  D-39 (Rev. 10/24)